# Patient Record
Sex: FEMALE
[De-identification: names, ages, dates, MRNs, and addresses within clinical notes are randomized per-mention and may not be internally consistent; named-entity substitution may affect disease eponyms.]

---

## 2018-11-16 ENCOUNTER — APPOINTMENT (OUTPATIENT)
Dept: GYNECOLOGIC ONCOLOGY | Facility: CLINIC | Age: 38
End: 2018-11-16

## 2018-11-16 PROBLEM — Z00.00 ENCOUNTER FOR PREVENTIVE HEALTH EXAMINATION: Status: ACTIVE | Noted: 2018-11-16

## 2023-06-14 ENCOUNTER — APPOINTMENT (OUTPATIENT)
Dept: GASTROENTEROLOGY | Facility: HOSPITAL | Age: 43
End: 2023-06-14

## 2023-06-16 ENCOUNTER — NON-APPOINTMENT (OUTPATIENT)
Age: 43
End: 2023-06-16

## 2023-06-16 ENCOUNTER — TRANSCRIPTION ENCOUNTER (OUTPATIENT)
Age: 43
End: 2023-06-16

## 2023-06-16 ENCOUNTER — APPOINTMENT (OUTPATIENT)
Dept: COLORECTAL SURGERY | Facility: CLINIC | Age: 43
End: 2023-06-16
Payer: COMMERCIAL

## 2023-06-16 VITALS
DIASTOLIC BLOOD PRESSURE: 53 MMHG | TEMPERATURE: 98 F | BODY MASS INDEX: 26.91 KG/M2 | WEIGHT: 150 LBS | HEIGHT: 62.5 IN | HEART RATE: 82 BPM | SYSTOLIC BLOOD PRESSURE: 105 MMHG

## 2023-06-16 DIAGNOSIS — Z85.43 PERSONAL HISTORY OF MALIGNANT NEOPLASM OF OVARY: ICD-10-CM

## 2023-06-16 PROCEDURE — 99204 OFFICE O/P NEW MOD 45 MIN: CPT

## 2023-06-16 NOTE — HISTORY OF PRESENT ILLNESS
[FreeTextEntry1] : 43 y/o  F presents for initial evaluation of colon cancer, patient referred by Gi Dr. Kyra Elliott\par PMH  Left ovary CA, \par PSH appendectomy, laparoscopies, Left oophorectomy 4/2018, Total hysterectomy and tubal ligation/R oophorectomy (12/2018), s/p chemo with Dr. CASTANEDA (June 2019)\par Current vape use x 10 years\par Denies FMH CRC. Denies FMH ovarian CA, Maternal half aunt w/ breast CA.\par \par Pt reports h/o endometriosis since 2013. Due to persistent abdominal pain, underwent laparoscopy and noted to have inflammation of appendix, s/p appendectomy in [place]. On pelvic US noted to have large ovarian cyst, s/p laparoscopy and removed a cyst, but the larger 4 cm ovary lesion note was not removed at the time and was advised on need for surgery.  Pt later developed pain in left leg, MRI pelvis noted increased size of ovarian mass to 10 cm, s/p surgery in [place] and noted to have "grade II ovarian CA." Pt s/p total hysterectomy and chemo at [facility] 12/2028. Per pt, MED ONC did genetic testing after diagnosis, was advised she's not an an increased risk of breast CA (likely negative BRCA).\par \par Pt lives in between [place] and . She recently returned to [place] and followed up w/ MED MINE Castaneda at [facility].\par Completed CT a/p scan Feb 2023 at [facility] which noted abnormal pelvic lymph nodes and 6 mm focus within liver\par Repeat CT completed 5/26/23 noted interval mild decrease in size in small right lower quadrant mesenteric lymph nodes. Interval decrease in size in vascular lesion within segment 8 of liver\par \par Pt also established care w/ PCP who anisa labs  6/6/2023 and noted to have microcytic anemia (hgb/hct 10.3/30.3). LFTs WNL. Referred for colonoscopy. Prior colonoscopy 2017 or sooner and was otherwise normal per pt\par \par Per GI note pt seen by Dr. Elliott 6/6/23: Patient seen for anemia on recent blood work 5/2023. Patient was not aware of prior history of anemia, and recent labs revealed low hgb, at the time patient c/o associated general fatigue without association to abdominal pain/n/v. Reports h/o chronic constipation with BM once every 2-3 days. Patient scheduled to undergo colonoscopy 6/11/23. \par \par Patient followed up w/ GI 06/14 regarding colonoscopy findings. She was noted to have circumferential mass at proximal colon and internal hemorrhoids. Biopsied c/w colonic tissue with invasive adenocarcinoma. Pt advised to our office for further evaluation. \par \par Colonoscopy performed [facility] with Gi Dr. Greg Lockwood on 6/11/23; Internal non bleeding hemorrhoids. An irregular circumferential mass seen at the proximal colon, covering roughly 80% of the circumference. The lesion is patent and the scope passed to cecum which was visualized normal. This mass lesion, noted to be firm, biopsied. \par \par Pathology performed [facility] 6/11/23\par Colon, ascending mass biopsy: \par Colonic tissue with invasive adenocarcinoma, moderately differentiated. No lymphovascular invasion by the tumor noted. 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 MSH2, MSH6 intact. BRAF V600E analysis with reflex testing is recommended\par \par She presents today for evaluation of colon cancer\par Reports h/o epigastric pain and feels she needs to vomit but cannot, c/o burning after eating. Admits to h/o constipation and may go 2-3 days w/o BM which she manages w/ taking herbal laxative (contains senna).  Admits to constant abdominal bloating, excessive gassiness. Not improved w/ defecation. Admits to straining w/ BMs. Admits to occasional BRB on TP or toilet bowl. \par \par Per chart review, pt was scheduled w/ GI [facility] on 6/14 for EGD but appt cancelled due to report of recent possible seizure after colonoscopy.  Pt reports she felt pain after colonoscopy and had involuntary arm and leg movements. Pt was conscious and had likely seizure. Reports she had episode like this once before in past years w/ tightening of muscles in setting of feeling tired or during pain. She had slight symptoms again and reported it to GI's office who recommended Neuro evaluation prior to EGD and anesthesia\par Pt has Neuro appt w/ Dr. Minda White (Tel: [phone]) this Monday\par \par \par

## 2023-06-16 NOTE — PHYSICAL EXAM
[No HSM] : no hepatosplenomegaly [Normal Breath Sounds] : Normal breath sounds [Normal Heart Sounds] : normal heart sounds [No Rash or Lesion] : No rash or lesion [Alert] : alert [Abdomen Masses] : No abdominal masses [Abdomen Tenderness] : ~T No ~M abdominal tenderness [JVD] : no jugular venous distention  [de-identified] : Well-healed midline incision

## 2023-06-16 NOTE — ASSESSMENT
[FreeTextEntry1] : Newly diagnosed proximal colon cancer with notable loss of MLH 1 and PMS 2 with personal history of ovarian cancer. Refer to Genetics to rule out MONTEJO syndrome\par \par Recommended baseline CEA lab, pt declines to complete blood work at this time.\par CT chest to complete staging. \par \par Keep Neurology appointment as scheduled for evaluation and clearance recommendations prior to proceeding with EGD and total colectomy\par Of note, she is traveling back to [place] on 6/21/2023 for two weeks and will then [place] to [place] for [place] treatment.\par \par I had an extensive discussion with the patient regarding the clinical implications of possible Montejo syndrome.  I have outlined to her that we would potentially recommend options for an extended resection–total colectomy with ileal rectal anastomosis.  The need for continued surveillance colonoscopy–yearly if she is Montejo syndrome would be appropriate as well.\par \par In addition I have reviewed with her the risk, benefits and alternatives of the surgical resection including but limited to the risk of bleeding, infection, hernia, obstruction, anastomotic leak, need for ileostomy creation, as well as the secondary effects of surgery including increased bowel frequency.  The risk of heart and lung complications were outlined.  All questions were answered.\par \par I have given the patient my personal email so she can reach out to me as soon as she knows her return to the [place] for scheduling.

## 2023-06-20 ENCOUNTER — OUTPATIENT (OUTPATIENT)
Dept: OUTPATIENT SERVICES | Facility: HOSPITAL | Age: 43
LOS: 1 days | End: 2023-06-20

## 2023-06-20 ENCOUNTER — APPOINTMENT (OUTPATIENT)
Dept: CT IMAGING | Facility: CLINIC | Age: 43
End: 2023-06-20
Payer: MEDICAID

## 2023-06-20 ENCOUNTER — APPOINTMENT (OUTPATIENT)
Dept: HEMATOLOGY ONCOLOGY | Facility: CLINIC | Age: 43
End: 2023-06-20

## 2023-06-20 PROCEDURE — 71250 CT THORAX DX C-: CPT | Mod: 26

## 2023-07-10 ENCOUNTER — APPOINTMENT (OUTPATIENT)
Dept: COLORECTAL SURGERY | Facility: CLINIC | Age: 43
End: 2023-07-10
Payer: MEDICAID

## 2023-07-10 PROCEDURE — 99202 OFFICE O/P NEW SF 15 MIN: CPT | Mod: 95

## 2023-07-10 NOTE — HISTORY OF PRESENT ILLNESS
[FreeTextEntry1] : Patient with right colon mass–invasive cancer with absence of MLH1 and PMS2.\par \par Genetic testing results pending.\par \par Patient currently in [place].

## 2023-07-10 NOTE — REASON FOR VISIT
No retinal tears or retinal detachment seen on clinical exam today. Reviewed the signs and symptoms of retinal tear/retinal detachment and the importance of calling for prompt evaluation should there be increasing floaters, new flashing lights, or decreasing peripheral vision in either eye at any time. Observation recommended. [Home] : at home, [unfilled] , at the time of the visit. [This encounter was initiated by telehealth (audio with video) and converted to telephone (audio only) due to technical difficulties.] : This encounter was initiated by telehealth (audio with video) and converted to telephone (audio only) due to technical difficulties.

## 2023-07-10 NOTE — ASSESSMENT
[FreeTextEntry1] : Patient expresses desire to pursue surgery in [place] with her family present for recovery in the postoperative period.  She will return to [place] after completion of surgery.  I addressed all questions and concerns.  Offered surgery in [place].  However, she wishes to proceed with surgery in [place].

## 2023-07-13 ENCOUNTER — NON-APPOINTMENT (OUTPATIENT)
Age: 43
End: 2023-07-13

## 2023-09-08 ENCOUNTER — RESULT REVIEW (OUTPATIENT)
Age: 43
End: 2023-09-08

## 2023-09-08 ENCOUNTER — OUTPATIENT (OUTPATIENT)
Dept: OUTPATIENT SERVICES | Facility: HOSPITAL | Age: 43
LOS: 1 days | End: 2023-09-08
Payer: COMMERCIAL

## 2023-09-08 DIAGNOSIS — C18.9 MALIGNANT NEOPLASM OF COLON, UNSPECIFIED: ICD-10-CM

## 2023-09-08 PROCEDURE — 88342 IMHCHEM/IMCYTCHM 1ST ANTB: CPT | Mod: 26,59

## 2023-09-08 PROCEDURE — 88321 CONSLTJ&REPRT SLD PREP ELSWR: CPT

## 2023-09-08 PROCEDURE — 88341 IMHCHEM/IMCYTCHM EA ADD ANTB: CPT | Mod: 26,59

## 2023-09-08 PROCEDURE — 88341 IMHCHEM/IMCYTCHM EA ADD ANTB: CPT

## 2023-09-13 LAB — SURGICAL PATHOLOGY STUDY: SIGNIFICANT CHANGE UP

## 2023-09-15 ENCOUNTER — TRANSCRIPTION ENCOUNTER (OUTPATIENT)
Age: 43
End: 2023-09-15

## 2023-09-18 ENCOUNTER — APPOINTMENT (OUTPATIENT)
Dept: COLORECTAL SURGERY | Facility: CLINIC | Age: 43
End: 2023-09-18
Payer: MEDICAID

## 2023-09-18 VITALS
SYSTOLIC BLOOD PRESSURE: 106 MMHG | DIASTOLIC BLOOD PRESSURE: 65 MMHG | BODY MASS INDEX: 23.62 KG/M2 | WEIGHT: 135 LBS | TEMPERATURE: 98 F | HEIGHT: 63.5 IN | HEART RATE: 68 BPM

## 2023-09-18 DIAGNOSIS — Z82.49 FAMILY HISTORY OF ISCHEMIC HEART DISEASE AND OTHER DISEASES OF THE CIRCULATORY SYSTEM: ICD-10-CM

## 2023-09-18 DIAGNOSIS — Z87.19 PERSONAL HISTORY OF OTHER DISEASES OF THE DIGESTIVE SYSTEM: ICD-10-CM

## 2023-09-18 DIAGNOSIS — Z82.3 FAMILY HISTORY OF STROKE: ICD-10-CM

## 2023-09-18 DIAGNOSIS — F17.200 NICOTINE DEPENDENCE, UNSPECIFIED, UNCOMPLICATED: ICD-10-CM

## 2023-09-18 DIAGNOSIS — Z83.511 FAMILY HISTORY OF GLAUCOMA: ICD-10-CM

## 2023-09-18 DIAGNOSIS — C18.9 MALIGNANT NEOPLASM OF COLON, UNSPECIFIED: ICD-10-CM

## 2023-09-18 PROCEDURE — 99214 OFFICE O/P EST MOD 30 MIN: CPT

## 2023-09-18 RX ORDER — ESTRADIOL 1 MG/G
1 GEL TOPICAL
Refills: 0 | Status: ACTIVE | COMMUNITY

## 2023-12-06 ENCOUNTER — NON-APPOINTMENT (OUTPATIENT)
Age: 43
End: 2023-12-06